# Patient Record
Sex: MALE | Race: WHITE
[De-identification: names, ages, dates, MRNs, and addresses within clinical notes are randomized per-mention and may not be internally consistent; named-entity substitution may affect disease eponyms.]

---

## 2019-10-09 ENCOUNTER — HOSPITAL ENCOUNTER (EMERGENCY)
Dept: HOSPITAL 11 - JP.ED | Age: 69
Discharge: HOME | End: 2019-10-09
Payer: MEDICARE

## 2019-10-09 VITALS — HEART RATE: 58 BPM | DIASTOLIC BLOOD PRESSURE: 56 MMHG | SYSTOLIC BLOOD PRESSURE: 98 MMHG

## 2019-10-09 DIAGNOSIS — E11.9: ICD-10-CM
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
## 2019-10-09 DIAGNOSIS — I25.2: ICD-10-CM

## 2019-10-09 DIAGNOSIS — Z79.82: ICD-10-CM

## 2019-10-09 DIAGNOSIS — Z79.899: ICD-10-CM

## 2019-10-09 DIAGNOSIS — Z79.4: ICD-10-CM

## 2019-10-09 DIAGNOSIS — E78.00: ICD-10-CM

## 2019-10-09 DIAGNOSIS — I10: ICD-10-CM

## 2019-10-09 DIAGNOSIS — S61.412A: Primary | ICD-10-CM

## 2019-10-09 PROCEDURE — 12001 RPR S/N/AX/GEN/TRNK 2.5CM/<: CPT

## 2019-10-09 PROCEDURE — 12002 RPR S/N/AX/GEN/TRNK2.6-7.5CM: CPT

## 2019-10-09 PROCEDURE — 99283 EMERGENCY DEPT VISIT LOW MDM: CPT

## 2019-10-09 PROCEDURE — 99282 EMERGENCY DEPT VISIT SF MDM: CPT

## 2019-10-09 PROCEDURE — 96360 HYDRATION IV INFUSION INIT: CPT

## 2019-10-09 NOTE — EDM.PDOC
ED HPI GENERAL MEDICAL PROBLEM





- General


Chief Complaint: Laceration


Stated Complaint: LACERATION TO LEFT HAND


Time Seen by Provider: 10/09/19 18:17


Source of Information: Reports: Patient


History Limitations: Reports: No Limitations





- History of Present Illness


INITIAL COMMENTS - FREE TEXT/NARRATIVE: 





pt was working in the shop nd a sharp tool went out of control and he ended up 

injurying his left hand. He had very ragged cuts. He had lik puncture type 

wounds. the most proximal was 1.25 inch. the others were 1/4 inch in length. 


Onset: Today, Sudden


Duration: Hour(s):


Location: Reports: Upper Extremity, Left


Associated Symptoms: Reports: No Other Symptoms


  ** Left Hand


Pain Score (Numeric/FACES): 4





- Related Data


 Allergies











Allergy/AdvReac Type Severity Reaction Status Date / Time


 


No Known Allergies Allergy   Verified 10/09/19 16:29











Home Meds: 


 Home Meds





Atenolol 12.5 mg PO BID 06/25/15 [History]


Gluc/Mick-Msm#2/C/D3/Pilo/Born [Glucosamin-Chondroitin-MSM] 1 tab PO BID 06/25/

15 [History]


Insulin Aspart [NovoLOG] See Protocol SQ ASDIRECTED 06/25/15 [History]


Insulin Glarg,Human.Rec.Analog [Lantus] 20 - 25 units SQ BEDTIME 06/25/15 [

History]


Multivit-Min/FA/Lycopene/Lut [Centrum Silver Tablet] 1 tab PO DAILY 06/25/15 [

History]


Aspirin [Halfprin] 1 tab PO DAILY 10/09/19 [History]


Clopidogrel [Plavix] 1 tab PO DAILY 10/09/19 [History]


Lisinopril 1 tab PO DAILY 10/09/19 [History]


atorvaSTATin [Lipitor] 1 tab PO DAILY 10/09/19 [History]











Past Medical History


HEENT History: Reports: Cataract, Impaired Vision


Cardiovascular History: Reports: CAD, High Cholesterol, Hypertension, MI


Other Genitourinary History: prostate cancer


Endocrine/Metabolic History: Reports: Diabetes, Type II





- Past Surgical History


HEENT Surgical History: Reports: Cataract Surgery


Cardiovascular Surgical History: Reports: Coronary Artery Stent


Other Male  Surgeries/Procedures: prostate surgery





Social & Family History





- Tobacco Use


Smoking Status *Q: Never Smoker





- Alcohol Use


Days Per Week of Alcohol Use: 7


Number of Drinks Per Day: 1


Total Drinks Per Week: 7





- Recreational Drug Use


Recreational Drug Use: No





- Living Situation & Occupation


Living situation: Reports: , with Spouse


Occupation: Retired





ED ROS GENERAL





- Review of Systems


Review Of Systems: See Below


Constitutional: Reports: No Symptoms


HEENT: Reports: No Symptoms


Respiratory: Reports: No Symptoms


Cardiovascular: Reports: No Symptoms


Endocrine: Reports: No Symptoms


GI/Abdominal: Reports: No Symptoms


: Reports: No Symptoms


Musculoskeletal: Reports: Other (laceration in the palm of the left hand. )





ED EXAM, SKIN/RASH


Exam: See Below


Text/Narrative:: 





pt was working in his shop and he had a sharp  tool get out of control and he 

had a puncture wound in 3 places in the hand. There was a lars most proximal 

which was 1.25 in length.  the other 2 were 1/4 inch in length. These were 

ragged and deep to the subq. 


Exam Limited By: No Limitations


General Appearance: Alert, Anxious


Neurological: Other (lacerations in the palm of the left hand. )





Course





- Vital Signs


Last Recorded V/S: 


 Last Vital Signs











Temp  36.9 C   10/09/19 16:33


 


Pulse  58 L  10/09/19 17:16


 


Resp  16   10/09/19 16:50


 


BP  98/56 L  10/09/19 17:16


 


Pulse Ox  97   10/09/19 16:50














- Orders/Labs/Meds


Orders: 


 Active Orders 24 hr











 Category Date Time Status


 


 Sodium Chloride 0.9% [Normal Saline] 1,000 ml Med  10/09/19 17:15 Active





 IV ASDIRECTED   








 Medication Orders





Sodium Chloride (Normal Saline)  1,000 mls @ 999 mls/hr IV ASDIRECTED ULISES


   Last Admin: 10/09/19 17:16  Dose: 999 mls/hr








Meds: 


Medications











Generic Name Dose Route Start Last Admin





  Trade Name Freq  PRN Reason Stop Dose Admin


 


Sodium Chloride  1,000 mls @ 999 mls/hr  10/09/19 17:15  10/09/19 17:16





  Normal Saline  IV   999 mls/hr





  ASDIRECTED ULISES   Administration





     





     





     





     














Discontinued Medications














Generic Name Dose Route Start Last Admin





  Trade Name Freq  PRN Reason Stop Dose Admin


 


Bacitracin  1 dose  10/09/19 16:03  10/09/19 17:15





  Bacitracin Oint 1 Gm  TOP  10/09/19 16:04  1 dose





  ONETIME ONE   Administration





     





     





     





     


 


Lidocaine HCl  20 ml  10/09/19 16:03  10/09/19 17:15





  Xylocaine 1%  INJECT  10/09/19 16:04  20 ml





  ONETIME ONE   Administration





     





     





     





     














- Re-Assessments/Exams


Free Text/Narrative Re-Assessment/Exam: 





10/09/19 18:28


pt had the hand soked. The wounds were infiltrated with lidocaine. The wound 

were ragged. The most proximal one was 1.25 inches in lenth. The other 2 were 1/

4 inch in length.  These were scrubbed well and infiltrated with lidocaine. 

These were loosely closed with 5-0 chromic and 5-0 prolenme. He was warned that 

infection was a real possiblity with these typr of woounds. 





Departure





- Departure


Time of Disposition: 18:12


Disposition: Home, Self-Care 01


Condition: Fair


Clinical Impression: 


 Laceration








- Discharge Information


Referrals: 


PCP,None [Primary Care Provider] - 


Forms:  ED Department Discharge


Care Plan Goals: 


keep dry, elevat. Leave the pressure dressing in place until Friday Am. No 

fuirther ointments, dress with a adaptic, 4x4s  and bettina, sr in 7-8 days, rtc 

if redness or drainage, keflex 500mg tid.  tylenol 3 1 tab q6h prn for pain #10





- My Orders


Last 24 Hours: 


My Active Orders





10/09/19 17:15


Sodium Chloride 0.9% [Normal Saline] 1,000 ml IV ASDIRECTED 














- Assessment/Plan


Last 24 Hours: 


My Active Orders





10/09/19 17:15


Sodium Chloride 0.9% [Normal Saline] 1,000 ml IV ASDIRECTED